# Patient Record
Sex: FEMALE | URBAN - METROPOLITAN AREA
[De-identification: names, ages, dates, MRNs, and addresses within clinical notes are randomized per-mention and may not be internally consistent; named-entity substitution may affect disease eponyms.]

---

## 2019-05-10 ENCOUNTER — APPOINTMENT (OUTPATIENT)
Age: 24
Setting detail: DERMATOLOGY
End: 2019-05-11

## 2019-05-10 VITALS — HEIGHT: 64 IN | WEIGHT: 145 LBS

## 2019-05-10 DIAGNOSIS — L20.89 OTHER ATOPIC DERMATITIS: ICD-10-CM

## 2019-05-10 PROCEDURE — 99202 OFFICE O/P NEW SF 15 MIN: CPT

## 2019-05-10 PROCEDURE — OTHER COUNSELING: OTHER

## 2019-05-10 PROCEDURE — OTHER PRESCRIPTION: OTHER

## 2019-05-10 RX ORDER — FLUTICASONE PROPIONATE 0.05 MG/G
THIN LAYER OINTMENT TOPICAL PRN
Qty: 1 | Refills: 1 | Status: ERX | COMMUNITY
Start: 2019-05-10

## 2019-05-10 RX ORDER — CRISABOROLE 20 MG/G
THIN LAYER OINTMENT TOPICAL BID
Qty: 1 | Refills: 2 | Status: ERX | COMMUNITY
Start: 2019-05-10

## 2019-05-10 ASSESSMENT — LOCATION SIMPLE DESCRIPTION DERM: LOCATION SIMPLE: LEFT LIP

## 2019-05-10 ASSESSMENT — LOCATION ZONE DERM: LOCATION ZONE: LIP

## 2019-05-10 ASSESSMENT — LOCATION DETAILED DESCRIPTION DERM: LOCATION DETAILED: LEFT SUPERIOR VERMILION LIP

## 2019-05-10 NOTE — HPI: RASH
How Severe Is Your Rash?: moderate
Is This A New Presentation, Or A Follow-Up?: Rash
Additional History: Her lips have always been dry. Then a year ago, she noticed that she began getting blisters on her lips. 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

## 2019-05-10 NOTE — PROCEDURE: COUNSELING
Patient Specific Counseling (Will Not Stick From Patient To Patient): Patient is using Dr. Dale wilson currently. Try using different face masks at work to see if this is the cause of the irritant. Likely it could be an irritant contact dermatitis from something her lips are coming into contact as it will improve when she’s not working. Send eucrisa to [facility]. Accidentally sent it to [facility] Patient Specific Counseling (Will Not Stick From Patient To Patient): Patient is using Dr. Dale wilson currently. Try using different face masks at work to see if this is the cause of the irritant. Likely it could be an irritant contact dermatitis from something her lips are coming into contact as it will improve when she’s not working. Send eucrisa to [facility] pharmacy. Accidentally sent it to [facility]

## 2019-05-11 RX ORDER — CRISABOROLE 20 MG/G
A THIN LAYER OINTMENT TOPICAL BID
Qty: 1 | Refills: 2 | Status: ERX | COMMUNITY
Start: 2019-05-11

## 2019-05-16 ENCOUNTER — RX ONLY (RX ONLY)
Age: 24
End: 2019-05-16

## 2019-05-16 RX ORDER — TACROLIMUS 1 MG/G
A THIN LAYER OINTMENT TOPICAL BID
Qty: 1 | Refills: 0 | Status: ERX | COMMUNITY
Start: 2019-05-16

## 2021-05-14 ENCOUNTER — APPOINTMENT (OUTPATIENT)
Dept: URBAN - METROPOLITAN AREA CLINIC 260 | Age: 26
Setting detail: DERMATOLOGY
End: 2021-05-14

## 2021-05-14 VITALS — HEIGHT: 64 IN | WEIGHT: 140 LBS

## 2021-05-14 DIAGNOSIS — D22 MELANOCYTIC NEVI: ICD-10-CM

## 2021-05-14 DIAGNOSIS — D18.0 HEMANGIOMA: ICD-10-CM

## 2021-05-14 DIAGNOSIS — L20.89 OTHER ATOPIC DERMATITIS: ICD-10-CM

## 2021-05-14 DIAGNOSIS — L82.1 OTHER SEBORRHEIC KERATOSIS: ICD-10-CM

## 2021-05-14 DIAGNOSIS — L81.4 OTHER MELANIN HYPERPIGMENTATION: ICD-10-CM

## 2021-05-14 DIAGNOSIS — Z71.89 OTHER SPECIFIED COUNSELING: ICD-10-CM

## 2021-05-14 PROBLEM — D23.71 OTHER BENIGN NEOPLASM OF SKIN OF RIGHT LOWER LIMB, INCLUDING HIP: Status: ACTIVE | Noted: 2021-05-14

## 2021-05-14 PROBLEM — D18.01 HEMANGIOMA OF SKIN AND SUBCUTANEOUS TISSUE: Status: ACTIVE | Noted: 2021-05-14

## 2021-05-14 PROBLEM — L20.84 INTRINSIC (ALLERGIC) ECZEMA: Status: ACTIVE | Noted: 2021-05-14

## 2021-05-14 PROBLEM — D22.5 MELANOCYTIC NEVI OF TRUNK: Status: ACTIVE | Noted: 2021-05-14

## 2021-05-14 PROCEDURE — 99214 OFFICE O/P EST MOD 30 MIN: CPT

## 2021-05-14 PROCEDURE — OTHER PRESCRIPTION: OTHER

## 2021-05-14 PROCEDURE — OTHER ADDITIONAL NOTES: OTHER

## 2021-05-14 PROCEDURE — OTHER COUNSELING: OTHER

## 2021-05-14 PROCEDURE — OTHER PHOTO-DOCUMENTATION: OTHER

## 2021-05-14 RX ORDER — BETAMETHASONE DIPROPIONATE 0.5 MG/G
CREAM TOPICAL BID
Qty: 1 | Refills: 0 | Status: ERX | COMMUNITY
Start: 2021-05-14

## 2021-05-14 ASSESSMENT — LOCATION DETAILED DESCRIPTION DERM
LOCATION DETAILED: LEFT MEDIAL BREAST 10-11:00 REGION
LOCATION DETAILED: LEFT LATERAL ABDOMEN
LOCATION DETAILED: INFERIOR THORACIC SPINE

## 2021-05-14 ASSESSMENT — LOCATION SIMPLE DESCRIPTION DERM
LOCATION SIMPLE: ABDOMEN
LOCATION SIMPLE: LEFT BREAST
LOCATION SIMPLE: UPPER BACK

## 2021-05-14 ASSESSMENT — LOCATION ZONE DERM: LOCATION ZONE: TRUNK

## 2021-05-14 NOTE — PROCEDURE: PHOTO-DOCUMENTATION
Detail Level: Detailed
Photo Preface (Leave Blank If You Do Not Want): Photographs were obtained today to monitor

## 2021-05-14 NOTE — PROCEDURE: ADDITIONAL NOTES
Detail Level: Detailed
Additional Notes: Patient uses a previously prescribed steroid cream Betamethasone to left lower leg.
Render Risk Assessment In Note?: no

## 2021-05-26 ENCOUNTER — RECORDS - HEALTHEAST (OUTPATIENT)
Dept: ADMINISTRATIVE | Facility: CLINIC | Age: 26
End: 2021-05-26

## 2023-02-15 LAB
GROUP B STREPTOCOCCUS (EXTERNAL): NEGATIVE
HEPATITIS B SURFACE ANTIGEN (EXTERNAL): NONREACTIVE
HEPATITIS C ANTIBODY (EXTERNAL): NONREACTIVE
HIV1+2 AB SERPL QL IA: NONREACTIVE
RUBELLA ANTIBODY IGG (EXTERNAL): NONREACTIVE

## 2023-07-07 LAB — TREPONEMA PALLIDUM ANTIBODY (EXTERNAL): NONREACTIVE

## 2023-07-28 ENCOUNTER — APPOINTMENT (OUTPATIENT)
Dept: URBAN - METROPOLITAN AREA CLINIC 260 | Age: 28
Setting detail: DERMATOLOGY
End: 2023-07-29

## 2023-07-28 DIAGNOSIS — D18.0 HEMANGIOMA: ICD-10-CM

## 2023-07-28 DIAGNOSIS — Z71.89 OTHER SPECIFIED COUNSELING: ICD-10-CM

## 2023-07-28 DIAGNOSIS — L81.4 OTHER MELANIN HYPERPIGMENTATION: ICD-10-CM

## 2023-07-28 DIAGNOSIS — I78.8 OTHER DISEASES OF CAPILLARIES: ICD-10-CM

## 2023-07-28 DIAGNOSIS — L90.6 STRIAE ATROPHICAE: ICD-10-CM

## 2023-07-28 DIAGNOSIS — L57.8 OTHER SKIN CHANGES DUE TO CHRONIC EXPOSURE TO NONIONIZING RADIATION: ICD-10-CM

## 2023-07-28 DIAGNOSIS — D22 MELANOCYTIC NEVI: ICD-10-CM

## 2023-07-28 DIAGNOSIS — Z33.1 PREGNANT STATE, INCIDENTAL: ICD-10-CM

## 2023-07-28 PROBLEM — D18.01 HEMANGIOMA OF SKIN AND SUBCUTANEOUS TISSUE: Status: ACTIVE | Noted: 2023-07-28

## 2023-07-28 PROBLEM — D22.5 MELANOCYTIC NEVI OF TRUNK: Status: ACTIVE | Noted: 2023-07-28

## 2023-07-28 PROBLEM — D23.71 OTHER BENIGN NEOPLASM OF SKIN OF RIGHT LOWER LIMB, INCLUDING HIP: Status: ACTIVE | Noted: 2023-07-28

## 2023-07-28 PROCEDURE — OTHER MIPS QUALITY: OTHER

## 2023-07-28 PROCEDURE — 99213 OFFICE O/P EST LOW 20 MIN: CPT

## 2023-07-28 PROCEDURE — OTHER ADDITIONAL NOTES: OTHER

## 2023-07-28 PROCEDURE — OTHER SUNSCREEN RECOMMENDATIONS: OTHER

## 2023-07-28 PROCEDURE — OTHER COUNSELING: OTHER

## 2023-07-28 ASSESSMENT — LOCATION DETAILED DESCRIPTION DERM
LOCATION DETAILED: LEFT MEDIAL BREAST 6-7:00 REGION
LOCATION DETAILED: RIGHT MEDIAL UPPER BACK
LOCATION DETAILED: RIGHT SUPERIOR MEDIAL UPPER BACK
LOCATION DETAILED: LEFT CENTRAL MALAR CHEEK
LOCATION DETAILED: RIGHT LATERAL BREAST 6-7:00 REGION
LOCATION DETAILED: RIGHT INFERIOR MEDIAL UPPER BACK

## 2023-07-28 ASSESSMENT — LOCATION SIMPLE DESCRIPTION DERM
LOCATION SIMPLE: RIGHT UPPER BACK
LOCATION SIMPLE: LEFT BREAST
LOCATION SIMPLE: LEFT CHEEK
LOCATION SIMPLE: RIGHT BREAST

## 2023-07-28 ASSESSMENT — LOCATION ZONE DERM
LOCATION ZONE: TRUNK
LOCATION ZONE: FACE

## 2023-07-28 NOTE — PROCEDURE: ADDITIONAL NOTES
Detail Level: Simple
Render Risk Assessment In Note?: no
Additional Notes: Discussed safe options while patient is pregnant, recommended Vitamin E oil
Additional Notes: Discussed skin changes that can occur during pregnancy

## 2023-09-30 ENCOUNTER — HOSPITAL ENCOUNTER (INPATIENT)
Facility: CLINIC | Age: 28
LOS: 3 days | Discharge: HOME | End: 2023-10-03
Attending: REGISTERED NURSE | Admitting: REGISTERED NURSE
Payer: COMMERCIAL

## 2023-09-30 DIAGNOSIS — D62 ANEMIA DUE TO BLOOD LOSS, ACUTE: ICD-10-CM

## 2023-09-30 PROBLEM — O42.90 PROM (PREMATURE RUPTURE OF MEMBRANES): Status: ACTIVE | Noted: 2023-09-30

## 2023-09-30 PROBLEM — Z36.89 ENCOUNTER FOR TRIAGE IN PREGNANT PATIENT: Status: ACTIVE | Noted: 2023-09-30

## 2023-09-30 LAB
ABO/RH(D): NORMAL
ALBUMIN MFR UR ELPH: <6 MG/DL
ALBUMIN SERPL BCG-MCNC: 3.5 G/DL (ref 3.5–5.2)
ALP SERPL-CCNC: 121 U/L (ref 35–104)
ALT SERPL W P-5'-P-CCNC: 10 U/L (ref 0–50)
ANION GAP SERPL CALCULATED.3IONS-SCNC: 12 MMOL/L (ref 7–15)
AST SERPL W P-5'-P-CCNC: 17 U/L (ref 0–45)
BILIRUB SERPL-MCNC: 0.2 MG/DL
BUN SERPL-MCNC: 7.2 MG/DL (ref 6–20)
CALCIUM SERPL-MCNC: 8.8 MG/DL (ref 8.6–10)
CHLORIDE SERPL-SCNC: 106 MMOL/L (ref 98–107)
CREAT SERPL-MCNC: 0.64 MG/DL (ref 0.51–0.95)
CREAT UR-MCNC: 44.5 MG/DL
DEPRECATED HCO3 PLAS-SCNC: 20 MMOL/L (ref 22–29)
EGFRCR SERPLBLD CKD-EPI 2021: >90 ML/MIN/1.73M2
ERYTHROCYTE [DISTWIDTH] IN BLOOD BY AUTOMATED COUNT: 13.6 % (ref 10–15)
GLUCOSE SERPL-MCNC: 75 MG/DL (ref 70–99)
HCT VFR BLD AUTO: 36 % (ref 35–47)
HGB BLD-MCNC: 11.8 G/DL (ref 11.7–15.7)
MCH RBC QN AUTO: 29.1 PG (ref 26.5–33)
MCHC RBC AUTO-ENTMCNC: 32.8 G/DL (ref 31.5–36.5)
MCV RBC AUTO: 89 FL (ref 78–100)
PLATELET # BLD AUTO: 141 10E3/UL (ref 150–450)
POTASSIUM SERPL-SCNC: 3.8 MMOL/L (ref 3.4–5.3)
PROT SERPL-MCNC: 6 G/DL (ref 6.4–8.3)
PROT/CREAT 24H UR: NORMAL MG/G{CREAT}
RBC # BLD AUTO: 4.05 10E6/UL (ref 3.8–5.2)
RUPTURE OF FETAL MEMBRANES BY ROM PLUS: POSITIVE
SODIUM SERPL-SCNC: 138 MMOL/L (ref 135–145)
SPECIMEN EXPIRATION DATE: NORMAL
WBC # BLD AUTO: 17.5 10E3/UL (ref 4–11)

## 2023-09-30 PROCEDURE — 120N000001 HC R&B MED SURG/OB

## 2023-09-30 PROCEDURE — 86901 BLOOD TYPING SEROLOGIC RH(D): CPT | Performed by: REGISTERED NURSE

## 2023-09-30 PROCEDURE — 258N000003 HC RX IP 258 OP 636: Performed by: REGISTERED NURSE

## 2023-09-30 PROCEDURE — 250N000013 HC RX MED GY IP 250 OP 250 PS 637: Performed by: REGISTERED NURSE

## 2023-09-30 PROCEDURE — 85027 COMPLETE CBC AUTOMATED: CPT | Performed by: REGISTERED NURSE

## 2023-09-30 PROCEDURE — 80053 COMPREHEN METABOLIC PANEL: CPT | Performed by: REGISTERED NURSE

## 2023-09-30 PROCEDURE — 250N000011 HC RX IP 250 OP 636: Mod: JZ | Performed by: REGISTERED NURSE

## 2023-09-30 PROCEDURE — 84112 EVAL AMNIOTIC FLUID PROTEIN: CPT | Performed by: REGISTERED NURSE

## 2023-09-30 PROCEDURE — 36415 COLL VENOUS BLD VENIPUNCTURE: CPT | Performed by: REGISTERED NURSE

## 2023-09-30 PROCEDURE — 250N000009 HC RX 250: Performed by: REGISTERED NURSE

## 2023-09-30 PROCEDURE — 84156 ASSAY OF PROTEIN URINE: CPT | Performed by: REGISTERED NURSE

## 2023-09-30 RX ORDER — IBUPROFEN 800 MG/1
800 TABLET, FILM COATED ORAL
Status: DISCONTINUED | OUTPATIENT
Start: 2023-09-30 | End: 2023-10-03 | Stop reason: HOSPADM

## 2023-09-30 RX ORDER — OXYTOCIN 10 [USP'U]/ML
10 INJECTION, SOLUTION INTRAMUSCULAR; INTRAVENOUS
Status: DISCONTINUED | OUTPATIENT
Start: 2023-09-30 | End: 2023-10-03 | Stop reason: HOSPADM

## 2023-09-30 RX ORDER — METHYLERGONOVINE MALEATE 0.2 MG/ML
200 INJECTION INTRAVENOUS
Status: DISCONTINUED | OUTPATIENT
Start: 2023-09-30 | End: 2023-10-01 | Stop reason: HOSPADM

## 2023-09-30 RX ORDER — ONDANSETRON 4 MG/1
4 TABLET, ORALLY DISINTEGRATING ORAL EVERY 6 HOURS PRN
Status: DISCONTINUED | OUTPATIENT
Start: 2023-09-30 | End: 2023-10-01 | Stop reason: HOSPADM

## 2023-09-30 RX ORDER — CITRIC ACID/SODIUM CITRATE 334-500MG
30 SOLUTION, ORAL ORAL
Status: DISCONTINUED | OUTPATIENT
Start: 2023-09-30 | End: 2023-10-01 | Stop reason: HOSPADM

## 2023-09-30 RX ORDER — MISOPROSTOL 100 UG/1
25 TABLET ORAL
Status: DISCONTINUED | OUTPATIENT
Start: 2023-09-30 | End: 2023-09-30

## 2023-09-30 RX ORDER — LIDOCAINE 40 MG/G
CREAM TOPICAL
Status: DISCONTINUED | OUTPATIENT
Start: 2023-09-30 | End: 2023-09-30 | Stop reason: HOSPADM

## 2023-09-30 RX ORDER — TERBUTALINE SULFATE 1 MG/ML
0.25 INJECTION, SOLUTION SUBCUTANEOUS
Status: DISCONTINUED | OUTPATIENT
Start: 2023-09-30 | End: 2023-10-01 | Stop reason: HOSPADM

## 2023-09-30 RX ORDER — PROCHLORPERAZINE MALEATE 10 MG
10 TABLET ORAL EVERY 6 HOURS PRN
Status: DISCONTINUED | OUTPATIENT
Start: 2023-09-30 | End: 2023-10-01 | Stop reason: HOSPADM

## 2023-09-30 RX ORDER — NALOXONE HYDROCHLORIDE 0.4 MG/ML
0.2 INJECTION, SOLUTION INTRAMUSCULAR; INTRAVENOUS; SUBCUTANEOUS
Status: DISCONTINUED | OUTPATIENT
Start: 2023-09-30 | End: 2023-10-01 | Stop reason: HOSPADM

## 2023-09-30 RX ORDER — NALOXONE HYDROCHLORIDE 0.4 MG/ML
0.4 INJECTION, SOLUTION INTRAMUSCULAR; INTRAVENOUS; SUBCUTANEOUS
Status: DISCONTINUED | OUTPATIENT
Start: 2023-09-30 | End: 2023-10-01 | Stop reason: HOSPADM

## 2023-09-30 RX ORDER — METOCLOPRAMIDE HYDROCHLORIDE 5 MG/ML
10 INJECTION INTRAMUSCULAR; INTRAVENOUS EVERY 6 HOURS PRN
Status: DISCONTINUED | OUTPATIENT
Start: 2023-09-30 | End: 2023-10-01 | Stop reason: HOSPADM

## 2023-09-30 RX ORDER — OXYTOCIN 10 [USP'U]/ML
10 INJECTION, SOLUTION INTRAMUSCULAR; INTRAVENOUS
Status: DISCONTINUED | OUTPATIENT
Start: 2023-09-30 | End: 2023-10-01 | Stop reason: HOSPADM

## 2023-09-30 RX ORDER — MISOPROSTOL 200 UG/1
400 TABLET ORAL
Status: DISCONTINUED | OUTPATIENT
Start: 2023-09-30 | End: 2023-10-01 | Stop reason: HOSPADM

## 2023-09-30 RX ORDER — OXYTOCIN/0.9 % SODIUM CHLORIDE 30/500 ML
100-340 PLASTIC BAG, INJECTION (ML) INTRAVENOUS CONTINUOUS PRN
Status: DISCONTINUED | OUTPATIENT
Start: 2023-09-30 | End: 2023-10-03 | Stop reason: HOSPADM

## 2023-09-30 RX ORDER — PANTOPRAZOLE SODIUM 40 MG/1
40 TABLET, DELAYED RELEASE ORAL AT BEDTIME
Status: DISCONTINUED | OUTPATIENT
Start: 2023-09-30 | End: 2023-10-03 | Stop reason: HOSPADM

## 2023-09-30 RX ORDER — CARBOPROST TROMETHAMINE 250 UG/ML
250 INJECTION, SOLUTION INTRAMUSCULAR
Status: DISCONTINUED | OUTPATIENT
Start: 2023-09-30 | End: 2023-10-01 | Stop reason: HOSPADM

## 2023-09-30 RX ORDER — OXYTOCIN/0.9 % SODIUM CHLORIDE 30/500 ML
340 PLASTIC BAG, INJECTION (ML) INTRAVENOUS CONTINUOUS PRN
Status: DISCONTINUED | OUTPATIENT
Start: 2023-09-30 | End: 2023-10-01 | Stop reason: HOSPADM

## 2023-09-30 RX ORDER — SODIUM CHLORIDE, SODIUM LACTATE, POTASSIUM CHLORIDE, CALCIUM CHLORIDE 600; 310; 30; 20 MG/100ML; MG/100ML; MG/100ML; MG/100ML
10-125 INJECTION, SOLUTION INTRAVENOUS CONTINUOUS
Status: DISCONTINUED | OUTPATIENT
Start: 2023-09-30 | End: 2023-10-03 | Stop reason: HOSPADM

## 2023-09-30 RX ORDER — MISOPROSTOL 200 UG/1
800 TABLET ORAL
Status: DISCONTINUED | OUTPATIENT
Start: 2023-09-30 | End: 2023-10-01 | Stop reason: HOSPADM

## 2023-09-30 RX ORDER — OXYTOCIN/0.9 % SODIUM CHLORIDE 30/500 ML
1-24 PLASTIC BAG, INJECTION (ML) INTRAVENOUS CONTINUOUS
Status: DISCONTINUED | OUTPATIENT
Start: 2023-09-30 | End: 2023-10-01 | Stop reason: HOSPADM

## 2023-09-30 RX ORDER — LABETALOL HYDROCHLORIDE 5 MG/ML
20-80 INJECTION, SOLUTION INTRAVENOUS EVERY 10 MIN PRN
Status: DISCONTINUED | OUTPATIENT
Start: 2023-09-30 | End: 2023-10-03 | Stop reason: HOSPADM

## 2023-09-30 RX ORDER — HYDRALAZINE HYDROCHLORIDE 20 MG/ML
10 INJECTION INTRAMUSCULAR; INTRAVENOUS
Status: DISCONTINUED | OUTPATIENT
Start: 2023-09-30 | End: 2023-10-03 | Stop reason: HOSPADM

## 2023-09-30 RX ORDER — KETOROLAC TROMETHAMINE 30 MG/ML
30 INJECTION, SOLUTION INTRAMUSCULAR; INTRAVENOUS
Status: DISCONTINUED | OUTPATIENT
Start: 2023-09-30 | End: 2023-10-03 | Stop reason: HOSPADM

## 2023-09-30 RX ORDER — CALCIUM CARBONATE 500 MG/1
1000 TABLET, CHEWABLE ORAL 3 TIMES DAILY PRN
Status: DISCONTINUED | OUTPATIENT
Start: 2023-09-30 | End: 2023-10-03 | Stop reason: HOSPADM

## 2023-09-30 RX ORDER — METOCLOPRAMIDE 10 MG/1
10 TABLET ORAL EVERY 6 HOURS PRN
Status: DISCONTINUED | OUTPATIENT
Start: 2023-09-30 | End: 2023-10-01 | Stop reason: HOSPADM

## 2023-09-30 RX ORDER — SODIUM CHLORIDE, SODIUM LACTATE, POTASSIUM CHLORIDE, CALCIUM CHLORIDE 600; 310; 30; 20 MG/100ML; MG/100ML; MG/100ML; MG/100ML
INJECTION, SOLUTION INTRAVENOUS CONTINUOUS PRN
Status: DISCONTINUED | OUTPATIENT
Start: 2023-09-30 | End: 2023-10-01 | Stop reason: HOSPADM

## 2023-09-30 RX ORDER — MORPHINE SULFATE 10 MG/ML
10 INJECTION, SOLUTION INTRAMUSCULAR; INTRAVENOUS
Status: COMPLETED | OUTPATIENT
Start: 2023-09-30 | End: 2023-10-01

## 2023-09-30 RX ORDER — HYDROXYZINE HYDROCHLORIDE 25 MG/1
100 TABLET, FILM COATED ORAL
Status: DISPENSED | OUTPATIENT
Start: 2023-09-30 | End: 2023-10-01

## 2023-09-30 RX ORDER — PROCHLORPERAZINE 25 MG
25 SUPPOSITORY, RECTAL RECTAL EVERY 12 HOURS PRN
Status: DISCONTINUED | OUTPATIENT
Start: 2023-09-30 | End: 2023-10-01 | Stop reason: HOSPADM

## 2023-09-30 RX ORDER — FENTANYL CITRATE 50 UG/ML
100 INJECTION, SOLUTION INTRAMUSCULAR; INTRAVENOUS
Status: DISCONTINUED | OUTPATIENT
Start: 2023-09-30 | End: 2023-10-01 | Stop reason: HOSPADM

## 2023-09-30 RX ORDER — ONDANSETRON 2 MG/ML
4 INJECTION INTRAMUSCULAR; INTRAVENOUS EVERY 6 HOURS PRN
Status: DISCONTINUED | OUTPATIENT
Start: 2023-09-30 | End: 2023-10-01 | Stop reason: HOSPADM

## 2023-09-30 RX ADMIN — PANTOPRAZOLE SODIUM 40 MG: 40 TABLET, DELAYED RELEASE ORAL at 21:17

## 2023-09-30 RX ADMIN — FAMOTIDINE 20 MG: 10 INJECTION, SOLUTION INTRAVENOUS at 18:58

## 2023-09-30 RX ADMIN — Medication 1 MILLI-UNITS/MIN: at 19:01

## 2023-09-30 RX ADMIN — Medication 3 MILLI-UNITS/MIN: at 20:38

## 2023-09-30 RX ADMIN — Medication 2 MILLI-UNITS/MIN: at 19:48

## 2023-09-30 RX ADMIN — SODIUM CHLORIDE, POTASSIUM CHLORIDE, SODIUM LACTATE AND CALCIUM CHLORIDE 500 ML: 600; 310; 30; 20 INJECTION, SOLUTION INTRAVENOUS at 16:57

## 2023-09-30 RX ADMIN — SODIUM CHLORIDE, POTASSIUM CHLORIDE, SODIUM LACTATE AND CALCIUM CHLORIDE: 600; 310; 30; 20 INJECTION, SOLUTION INTRAVENOUS at 21:30

## 2023-09-30 RX ADMIN — Medication 5 MILLI-UNITS/MIN: at 22:00

## 2023-09-30 RX ADMIN — Medication 4 MILLI-UNITS/MIN: at 21:17

## 2023-09-30 ASSESSMENT — ACTIVITIES OF DAILY LIVING (ADL)
DOING_ERRANDS_INDEPENDENTLY_DIFFICULTY: NO
ADLS_ACUITY_SCORE: 20
DRESSING/BATHING_DIFFICULTY: NO
HEARING_DIFFICULTY_OR_DEAF: NO
FALL_HISTORY_WITHIN_LAST_SIX_MONTHS: NO
DIFFICULTY_EATING/SWALLOWING: NO
WEAR_GLASSES_OR_BLIND: YES
WALKING_OR_CLIMBING_STAIRS_DIFFICULTY: NO
VISION_MANAGEMENT: CONTACTS
ADLS_ACUITY_SCORE: 31
DIFFICULTY_COMMUNICATING: NO
TOILETING_ISSUES: NO
ADLS_ACUITY_SCORE: 20
CONCENTRATING,_REMEMBERING_OR_MAKING_DECISIONS_DIFFICULTY: NO
ADLS_ACUITY_SCORE: 20
ADLS_ACUITY_SCORE: 20

## 2023-09-30 NOTE — PLAN OF CARE
Data: [facility]: 2023  1:46 PM.  Reason for maternal/fetal assessment is leaking vaginal fluid. Patient reports SROM since 23 around 1730. Patient denies uterine contractions, leaking of vaginal fluid/rupture of membranes. Patient reports fetal movement is normal. Patient is a Unknown . Prenatal record reviewed. Pregnancy has been uncomplicated. Support person is present.     Fetal HR baseline was 135, variability is minimal (detectable, amplitude less than or equal to 5 bpm). Accelerations: increase 15 bpm above baseline lasting 15 seconds. Decelerations: absent. Uterine assessment is mild by palpation during contractions and soft by palpation at rest. Cervix 1 cm dilated and 60-70% effaced. Fetal station -2. Fetal presentation   per  not detected . Membranes:  ROM positive on admission .    Vital signs  elevated BP . Patient reports pain and is coping.     Action: Verbal consent for EFM. Triage assessment completed. Patient may meet criteria for early labor.     Response: Patient verbalized understanding of triage assessment. Will contact monitor with assessment and consideration of early labor discharge vs admission.

## 2023-09-30 NOTE — H&P
"HISTORY AND PHYSICAL UPDATE ADMISSION EXAM    Name: Mariola Larose  YOB: 1995  Medical Record Number: [id number]    History of Present Illness: Mariola Larose is a 28 year old female  who is 40w5d pregnant and being admitted for SROM. She initially noticed LOF yesterday at 1530- she wasn't sure if she was just leaking urine, and continued to monitor. After continuing to leak throughout the night and day, she [place] [facility] [place]OM was confirmed with a ROM plus. GBS negative. On admission BP was noted to be 148/93, repeat 140/86. Denies headache, visual changes, RUQ pain, N/V, SOB, and dizziness. She is supported by her , Sy.     Last EFW  at 40w4d, 7#2oz (3240g)    Estimated Date of Delivery: 23      OB History    Para Term  AB Living   1 0 0 0 0 0   SAB IAB Ectopic Multiple Live Births   0 0 0 0 0      # Outcome Date GA Lbr Cuong/2nd Weight Sex Delivery Anes PTL Lv   1 Current               Tdap 23    Prenatal Complications:   COVID-19 at 10 weeks  Thrombocytopenia (28 wks: 128; 30 wks: 136; 36 wks: 120)    Exam:      BP (!) 148/93 (BP Location: Right arm, Patient Position: Semi-Randle's, Cuff Size: Adult Regular)   Pulse 89   Temp 98.3  F (36.8  C) (Oral)   Resp 19   Ht 1.626 m (5' 4\")   Wt 85.3 kg (188 lb)   SpO2 96%   BMI 32.27 kg/m      Fetal heart Rate Category 1  Contractions occasional, mild    HEENT grossly normal  Neck: no lymphadenopathy or thryoidomegaly  Lungs respirations regular, unlabored  ABD gravid, non-tender  EXT:  1+ non-pitting edema, moves freely    Vaginal exam /-2 per RN  Membranes: SROM, 23 @ 1530  Cephalic by clinic US yesterday and digital exam by RN today    Assessment:   - SROM  - GBS negative  - O positive    Plan:   - Admit - see IP orders  - HELLP labs ordered and WNL. HTN orderset placed but not yet diagnosed with gHTN given < 4 hours between elevations.   - Reviewed R/B/A of proceeding with PO cytotec " given prolonged ROM- Mariola is in agreement with this plan. - Proceed with 3 doses of PO cytotec, then transition to IV Pitocin. Discussed exam with rupture of forebag if applicable once regular strong contractions.   -Platelets 141 on admission  - Encourage frequent repositioning to facilitate fetal rotation and descent.   - Pain medication as requested.  - Anticipate   - Active management of the third stage    Prenatal record reviewed.    GERRY Clemente  I was present for all plan of care discussion and physical exam, I agree with the above documentation. Patient consented to [facility] participation in care. FIDEL Harp Dr. remains available for consultation and collaboration as needed.      2023   2:38 PM

## 2023-09-30 NOTE — PROGRESS NOTES
Since admission have been monitoring FHR following prolonged episode of tachycardia to 180s with wandering baseline. Standard intrauterine rescuscitation measures were used and ultimately FHR resolved to category 1. Pt is afebrile and WBC are WNL.   Per RN and TOCO Mariola has started mildly dayna and is now likely in early labor. Experiencing this as strong period cramps. Given PROM > 24 hours still recommend using induction methods but at this time would recommend low dose pitocin vs cytotec. She and her partner are agreeable to this plan.

## 2023-09-30 NOTE — PROVIDER NOTIFICATION
RN paged CNM Bessy Ball, current plan to begin cytotec. Patient changed positions from her LT side to her back and baby's OH became elevated. Notified CNM of fetal tachycardia, new orders to establish IV access and administer 500mL bolus, RN to monitor for the next 30 minutes.    Bolus initiated after IV placement at 1657. Per CNM, administer whole liter of fluid and monitor EFM. RN to hold cytotec administration, to touch base with CNM after fluids are in.

## 2023-10-01 ENCOUNTER — ANESTHESIA EVENT (OUTPATIENT)
Dept: OBGYN | Facility: CLINIC | Age: 28
End: 2023-10-01
Payer: COMMERCIAL

## 2023-10-01 ENCOUNTER — ANESTHESIA (OUTPATIENT)
Dept: OBGYN | Facility: CLINIC | Age: 28
End: 2023-10-01
Payer: COMMERCIAL

## 2023-10-01 LAB
ABO/RH(D): NORMAL
ANTIBODY SCREEN: NEGATIVE
HGB BLD-MCNC: 10.7 G/DL (ref 11.7–15.7)
HOLD SPECIMEN: NORMAL
HOLD SPECIMEN: NORMAL
PLATELET # BLD AUTO: 146 10E3/UL (ref 150–450)
SPECIMEN EXPIRATION DATE: NORMAL

## 2023-10-01 PROCEDURE — 722N000001 HC LABOR CARE VAGINAL DELIVERY SINGLE

## 2023-10-01 PROCEDURE — 250N000011 HC RX IP 250 OP 636: Mod: JZ

## 2023-10-01 PROCEDURE — 0W3R7ZZ CONTROL BLEEDING IN GENITOURINARY TRACT, VIA NATURAL OR ARTIFICIAL OPENING: ICD-10-PCS | Performed by: ADVANCED PRACTICE MIDWIFE

## 2023-10-01 PROCEDURE — 999N000016 HC STATISTIC ATTENDANCE AT DELIVERY

## 2023-10-01 PROCEDURE — 250N000011 HC RX IP 250 OP 636

## 2023-10-01 PROCEDURE — 3E0R3BZ INTRODUCTION OF ANESTHETIC AGENT INTO SPINAL CANAL, PERCUTANEOUS APPROACH: ICD-10-PCS

## 2023-10-01 PROCEDURE — 250N000009 HC RX 250

## 2023-10-01 PROCEDURE — 370N000003 HC ANESTHESIA WARD SERVICE: Performed by: ANESTHESIOLOGY

## 2023-10-01 PROCEDURE — 258N000003 HC RX IP 258 OP 636: Performed by: ADVANCED PRACTICE MIDWIFE

## 2023-10-01 PROCEDURE — 250N000013 HC RX MED GY IP 250 OP 250 PS 637: Performed by: REGISTERED NURSE

## 2023-10-01 PROCEDURE — 250N000009 HC RX 250: Performed by: REGISTERED NURSE

## 2023-10-01 PROCEDURE — 250N000009 HC RX 250: Performed by: ANESTHESIOLOGY

## 2023-10-01 PROCEDURE — 10907ZC DRAINAGE OF AMNIOTIC FLUID, THERAPEUTIC FROM PRODUCTS OF CONCEPTION, VIA NATURAL OR ARTIFICIAL OPENING: ICD-10-PCS | Performed by: ADVANCED PRACTICE MIDWIFE

## 2023-10-01 PROCEDURE — 36415 COLL VENOUS BLD VENIPUNCTURE: CPT | Performed by: ADVANCED PRACTICE MIDWIFE

## 2023-10-01 PROCEDURE — 258N000003 HC RX IP 258 OP 636: Performed by: REGISTERED NURSE

## 2023-10-01 PROCEDURE — 85018 HEMOGLOBIN: CPT | Performed by: ADVANCED PRACTICE MIDWIFE

## 2023-10-01 PROCEDURE — 86901 BLOOD TYPING SEROLOGIC RH(D): CPT | Performed by: ADVANCED PRACTICE MIDWIFE

## 2023-10-01 PROCEDURE — 120N000001 HC R&B MED SURG/OB

## 2023-10-01 PROCEDURE — 250N000011 HC RX IP 250 OP 636: Performed by: REGISTERED NURSE

## 2023-10-01 PROCEDURE — 00HU33Z INSERTION OF INFUSION DEVICE INTO SPINAL CANAL, PERCUTANEOUS APPROACH: ICD-10-PCS

## 2023-10-01 PROCEDURE — 85049 AUTOMATED PLATELET COUNT: CPT | Performed by: ADVANCED PRACTICE MIDWIFE

## 2023-10-01 PROCEDURE — 250N000011 HC RX IP 250 OP 636: Mod: JZ | Performed by: ADVANCED PRACTICE MIDWIFE

## 2023-10-01 RX ORDER — BUPIVACAINE HYDROCHLORIDE 2.5 MG/ML
INJECTION, SOLUTION EPIDURAL; INFILTRATION; INTRACAUDAL PRN
Status: DISCONTINUED | OUTPATIENT
Start: 2023-10-01 | End: 2023-10-01

## 2023-10-01 RX ORDER — FENTANYL CITRATE-0.9 % NACL/PF 10 MCG/ML
100 PLASTIC BAG, INJECTION (ML) INTRAVENOUS EVERY 5 MIN PRN
Status: DISCONTINUED | OUTPATIENT
Start: 2023-10-01 | End: 2023-10-01 | Stop reason: HOSPADM

## 2023-10-01 RX ORDER — CEFAZOLIN SODIUM 2 G/100ML
2 INJECTION, SOLUTION INTRAVENOUS
Status: DISCONTINUED | OUTPATIENT
Start: 2023-10-01 | End: 2023-10-01

## 2023-10-01 RX ORDER — HYDROCORTISONE 25 MG/G
CREAM TOPICAL 3 TIMES DAILY PRN
Status: DISCONTINUED | OUTPATIENT
Start: 2023-10-01 | End: 2023-10-03 | Stop reason: HOSPADM

## 2023-10-01 RX ORDER — LIDOCAINE HYDROCHLORIDE AND EPINEPHRINE 15; 5 MG/ML; UG/ML
INJECTION, SOLUTION EPIDURAL PRN
Status: DISCONTINUED | OUTPATIENT
Start: 2023-10-01 | End: 2023-10-01

## 2023-10-01 RX ORDER — DOCUSATE SODIUM 100 MG/1
100 CAPSULE, LIQUID FILLED ORAL DAILY
Status: DISCONTINUED | OUTPATIENT
Start: 2023-10-02 | End: 2023-10-03 | Stop reason: HOSPADM

## 2023-10-01 RX ORDER — NALBUPHINE HYDROCHLORIDE 20 MG/ML
2.5-5 INJECTION, SOLUTION INTRAMUSCULAR; INTRAVENOUS; SUBCUTANEOUS EVERY 6 HOURS PRN
Status: DISCONTINUED | OUTPATIENT
Start: 2023-10-01 | End: 2023-10-03 | Stop reason: HOSPADM

## 2023-10-01 RX ORDER — MISOPROSTOL 200 UG/1
800 TABLET ORAL
Status: DISCONTINUED | OUTPATIENT
Start: 2023-10-01 | End: 2023-10-03 | Stop reason: HOSPADM

## 2023-10-01 RX ORDER — MODIFIED LANOLIN
OINTMENT (GRAM) TOPICAL
Status: DISCONTINUED | OUTPATIENT
Start: 2023-10-01 | End: 2023-10-03 | Stop reason: HOSPADM

## 2023-10-01 RX ORDER — CARBOPROST TROMETHAMINE 250 UG/ML
250 INJECTION, SOLUTION INTRAMUSCULAR
Status: DISCONTINUED | OUTPATIENT
Start: 2023-10-01 | End: 2023-10-03 | Stop reason: HOSPADM

## 2023-10-01 RX ORDER — SODIUM CHLORIDE, SODIUM LACTATE, POTASSIUM CHLORIDE, CALCIUM CHLORIDE 600; 310; 30; 20 MG/100ML; MG/100ML; MG/100ML; MG/100ML
INJECTION, SOLUTION INTRAVENOUS CONTINUOUS
Status: DISCONTINUED | OUTPATIENT
Start: 2023-10-01 | End: 2023-10-03 | Stop reason: HOSPADM

## 2023-10-01 RX ORDER — MISOPROSTOL 200 UG/1
400 TABLET ORAL
Status: DISCONTINUED | OUTPATIENT
Start: 2023-10-01 | End: 2023-10-03 | Stop reason: HOSPADM

## 2023-10-01 RX ORDER — METHYLERGONOVINE MALEATE 0.2 MG/ML
200 INJECTION INTRAVENOUS
Status: DISCONTINUED | OUTPATIENT
Start: 2023-10-01 | End: 2023-10-03 | Stop reason: HOSPADM

## 2023-10-01 RX ORDER — OXYTOCIN/0.9 % SODIUM CHLORIDE 30/500 ML
340 PLASTIC BAG, INJECTION (ML) INTRAVENOUS CONTINUOUS PRN
Status: DISCONTINUED | OUTPATIENT
Start: 2023-10-01 | End: 2023-10-03 | Stop reason: HOSPADM

## 2023-10-01 RX ORDER — IBUPROFEN 800 MG/1
800 TABLET, FILM COATED ORAL EVERY 6 HOURS PRN
Status: DISCONTINUED | OUTPATIENT
Start: 2023-10-01 | End: 2023-10-03 | Stop reason: HOSPADM

## 2023-10-01 RX ORDER — CEFAZOLIN SODIUM 2 G/100ML
2 INJECTION, SOLUTION INTRAVENOUS ONCE
Status: COMPLETED | OUTPATIENT
Start: 2023-10-01 | End: 2023-10-01

## 2023-10-01 RX ORDER — OXYTOCIN 10 [USP'U]/ML
10 INJECTION, SOLUTION INTRAMUSCULAR; INTRAVENOUS
Status: DISCONTINUED | OUTPATIENT
Start: 2023-10-01 | End: 2023-10-03 | Stop reason: HOSPADM

## 2023-10-01 RX ORDER — ACETAMINOPHEN 325 MG/1
650 TABLET ORAL EVERY 4 HOURS PRN
Status: DISCONTINUED | OUTPATIENT
Start: 2023-10-01 | End: 2023-10-03 | Stop reason: HOSPADM

## 2023-10-01 RX ORDER — BISACODYL 10 MG
10 SUPPOSITORY, RECTAL RECTAL DAILY PRN
Status: DISCONTINUED | OUTPATIENT
Start: 2023-10-01 | End: 2023-10-03 | Stop reason: HOSPADM

## 2023-10-01 RX ORDER — LIDOCAINE HCL/EPINEPHRINE/PF 2%-1:200K
VIAL (ML) INJECTION PRN
Status: DISCONTINUED | OUTPATIENT
Start: 2023-10-01 | End: 2023-10-01

## 2023-10-01 RX ORDER — FENTANYL/ROPIVACAINE/NS/PF 2MCG/ML-.1
PLASTIC BAG, INJECTION (ML) EPIDURAL
Status: DISCONTINUED | OUTPATIENT
Start: 2023-10-01 | End: 2023-10-01 | Stop reason: HOSPADM

## 2023-10-01 RX ADMIN — BUPIVACAINE HYDROCHLORIDE 6 ML: 2.5 INJECTION, SOLUTION EPIDURAL; INFILTRATION; INTRACAUDAL at 04:39

## 2023-10-01 RX ADMIN — TRANEXAMIC ACID 1 G: 1 INJECTION, SOLUTION INTRAVENOUS at 17:16

## 2023-10-01 RX ADMIN — TRANEXAMIC ACID 1 G: 1 INJECTION, SOLUTION INTRAVENOUS at 19:14

## 2023-10-01 RX ADMIN — SODIUM CHLORIDE, POTASSIUM CHLORIDE, SODIUM LACTATE AND CALCIUM CHLORIDE 500 ML: 600; 310; 30; 20 INJECTION, SOLUTION INTRAVENOUS at 04:05

## 2023-10-01 RX ADMIN — CEFAZOLIN SODIUM 2 G: 2 INJECTION, SOLUTION INTRAVENOUS at 21:00

## 2023-10-01 RX ADMIN — Medication: at 16:19

## 2023-10-01 RX ADMIN — Medication 6 MILLI-UNITS/MIN: at 01:47

## 2023-10-01 RX ADMIN — Medication 340 ML/HR: at 18:54

## 2023-10-01 RX ADMIN — LIDOCAINE HYDROCHLORIDE 20 ML: 10 INJECTION, SOLUTION INFILTRATION; PERINEURAL at 18:59

## 2023-10-01 RX ADMIN — Medication: at 04:39

## 2023-10-01 RX ADMIN — Medication 8 MILLI-UNITS/MIN: at 06:31

## 2023-10-01 RX ADMIN — LIDOCAINE HYDROCHLORIDE,EPINEPHRINE BITARTRATE 7 ML: 20; .005 INJECTION, SOLUTION EPIDURAL; INFILTRATION; INTRACAUDAL; PERINEURAL at 10:03

## 2023-10-01 RX ADMIN — SODIUM CHLORIDE, POTASSIUM CHLORIDE, SODIUM LACTATE AND CALCIUM CHLORIDE 100 ML: 600; 310; 30; 20 INJECTION, SOLUTION INTRAVENOUS at 21:05

## 2023-10-01 RX ADMIN — KETOROLAC TROMETHAMINE 30 MG: 30 INJECTION INTRAMUSCULAR; INTRAVENOUS at 20:36

## 2023-10-01 RX ADMIN — HYDROXYZINE HYDROCHLORIDE 100 MG: 25 TABLET, FILM COATED ORAL at 02:54

## 2023-10-01 RX ADMIN — MORPHINE SULFATE 10 MG: 10 INJECTION INTRAVENOUS at 02:53

## 2023-10-01 RX ADMIN — LIDOCAINE HYDROCHLORIDE,EPINEPHRINE BITARTRATE 3 ML: 15; .005 INJECTION, SOLUTION EPIDURAL; INFILTRATION; INTRACAUDAL; PERINEURAL at 04:37

## 2023-10-01 ASSESSMENT — ACTIVITIES OF DAILY LIVING (ADL)
ADLS_ACUITY_SCORE: 20

## 2023-10-01 NOTE — PROGRESS NOTES
Started Nitrous Oxide at 0214. Remained with patient. Her vital signs remained stable. She tolerated well, and reported that the nitrous helped a little with her contractions.     Carmita Junior RN

## 2023-10-01 NOTE — ANESTHESIA PREPROCEDURE EVALUATION
Anesthesia Pre-Procedure Evaluation    Patient: Mariola Larose   MRN: [id number] : 1995        Procedure : * No procedures listed *          History reviewed. No pertinent past medical history.   History reviewed. No pertinent surgical history.   Allergies   Allergen Reactions    Azithromycin Rash      Social History     Tobacco Use    Smoking status: Never     Passive exposure: Never    Smokeless tobacco: Never   Substance Use Topics    Alcohol use: Not on file      Wt Readings from Last 1 Encounters:   23 85.3 kg (188 lb)        Anesthesia Evaluation            ROS/MED HX  ENT/Pulmonary:  - neg pulmonary ROS     Neurologic:  - neg neurologic ROS     Cardiovascular:  - neg cardiovascular ROS     METS/Exercise Tolerance:     Hematologic:  - neg hematologic  ROS     Musculoskeletal:  - neg musculoskeletal ROS     GI/Hepatic:  - neg GI/hepatic ROS     Renal/Genitourinary:  - neg Renal ROS     Endo:  - neg endo ROS     Psychiatric/Substance Use:       Infectious Disease:  - neg infectious disease ROS     Malignancy:       Other:      (+) Possibly pregnant (pregnant), , ,         Physical Exam    Airway  airway exam normal           Respiratory Devices and Support         Dental           Cardiovascular             Pulmonary                   OUTSIDE LABS:  CBC:   Lab Results   Component Value Date    WBC 17.5 (H) 2023    HGB 11.8 2023    HCT 36.0 2023     (L) 2023     BMP:   Lab Results   Component Value Date     2023    POTASSIUM 3.8 2023    CHLORIDE 106 2023    CO2 20 (L) 2023    BUN 7.2 2023    CR 0.64 2023    GLC 75 2023     COAGS: No results found for: PTT, INR, FIBR  POC: No results found for: BGM, HCG, HCGS  HEPATIC:   Lab Results   Component Value Date    ALBUMIN 3.5 2023    PROTTOTAL 6.0 (L) 2023    ALT 10 2023    AST 17 2023    ALKPHOS 121 (H) 2023    BILITOTAL 0.2 2023     OTHER:    Lab Results   Component Value Date    FLORES 8.8 09/30/2023       Anesthesia Plan    ASA Status:  2       Anesthesia Type: Spinal.              Consents    Anesthesia Plan(s) and associated risks, benefits, and realistic alternatives discussed. Questions answered and patient/representative(s) expressed understanding.     - Discussed: Risks, Benefits and Alternatives for the PROCEDURE were discussed     - Discussed with:  Patient            Postoperative Care            Comments:    Other Comments: Patient requests labor epidural.  Chart reviewed, including labs.  I reviewed the options and risks with the patient, including but not limited to: bleeding, infection, damage to tissues under the skin (nerves, muscles, bloodvessels), hypotension, headache, and epidural failure.  The patient's questions were answered and the consent was signed. The patient agrees to elective epidural placement.                Khushboo Moyer MD

## 2023-10-01 NOTE — PROGRESS NOTES
"Patient Name:  Mariola Larose  :      1995  MRN:      [id number]    Assessment:     at 40w6d  Early labor  Category 1 FHTs  Prolonged ROM x 36 hrs  AROM of forebag  Gestational HTN      Plan:   -Discussed R/B/A of amniotomy. She consents to this AROM of forebag. Completed with return of small fluid and   -Continue position changes to optimize fetal descent.  -Routine support & management.   -Encourage position changes, ambulation as appropriate, rest as desired.  -Therapeutic rest, epidural when requested  -Anticipate progress and NSVB.   -Active management of the third stage    Subjective:  Mariola Larose is coping well with contractions. Tried N2O2 but did did not find it helpful- currently using non pharmacologic  methods for pain relief. Good PO fluid intake. Voiding without issue. She is interested in therapeutic rest, then likely an epidural when more actively laboring.  Sy supportive at bedside.     Objective:  /81 (BP Location: Right arm, Patient Position: Semi-Randle's, Cuff Size: Adult Regular)   Pulse 106   Temp 99.4  F (37.4  C) (Oral)   Resp 17   Ht 1.626 m (5' 4\")   Wt 85.3 kg (188 lb)   SpO2 96%   Breastfeeding No   BMI 32.27 kg/m      FHR:Baseline: 135 bpm, Variability: Moderate (6 - 25 bpm), Accelerations: present and Decelerations: Absent    Uterine contractions:Ronda Frequency: Every 3-4 minutes, Duration: 60-90 seconds and Intensity: moderate  Pitocin currently at 6 mu/min.     SVE: 3/90/-1    Provider: GERRY Clemente Dr. remains available for consultation and collaboration as needed.      Date:  10/1/2023  Time:  2:49 AM    "

## 2023-10-01 NOTE — PROGRESS NOTES
IUPC in place since 1211. MVU's initially ~150, then increased to 185. Dysfunctional contraction pattern with high frequency low amplitude contractions since 1355. IUPC flushed with no improvement and MVU's ~110. SVE now 8/100/0. IUC replaced and MVU's remain inadequate. Continue pitocin titration to achieve adequate MVU's. Last temp 99.2. Did have 1 prolonged decel x 3 minutes at 1407. Resolved with position change. Now with moderate variability, large accels, no decels. Continue attempts at .     YOVANNY Macias CNM on 10/1/2023 at 3:00 PM

## 2023-10-01 NOTE — ANESTHESIA PROCEDURE NOTES
"Epidural catheter Procedure Note    Pre-Procedure   Staff -        Anesthesiologist:  Khushboo Moyer MD       Performed By: anesthesiologist       Location: OB       Procedure Start/Stop Times: 10/1/2023 4:25 AM and 10/1/2023 4:43 AM       Pre-Anesthestic Checklist: patient identified, IV checked, risks and benefits discussed, informed consent, monitors and equipment checked, pre-op evaluation and post-op pain management  Timeout:       Correct Patient: Yes        Correct Procedure: Yes        Correct Site: Yes        Correct Position: Yes   Procedure Documentation  Procedure: epidural catheter       Diagnosis: labor pain       Patient Position: sitting       Patient Prep/Sterile Barriers: sterile gloves, mask, patient draped       Skin prep: Chloraprep       Local skin infiltrated with 3 mL of 1% lidocaine.        Insertion Site: L3-4. (midline approach).       Technique: LORT saline        VIKKI at 4.5 cm.       Needle Type: [employer]       Needle Gauge: 18.        Needle Length (Inches): 3.5        Catheter: 20 G.          Catheter threaded easily.         6 cm epidural space.         Threaded 11 cm at skin.         # of attempts: 1 and  # of redirects:     Assessment/Narrative         Paresthesias: No.       Test dose of 3 mL lidocaine 1.5% w/ 1:200,000 epinephrine at.         Test dose negative, 3 minutes after injection, for signs of intravascular, subdural, or intrathecal injection.       Insertion/Infusion Method: LORT saline       Aspiration negative for Heme or CSF via Epidural Catheter.    Medication(s) Administered   Medication Administration Time: 10/1/2023 4:25 AM      FOR [facility] ONLY:   Pain Team Contact information: please page the Pain Team Via [employer]. Search \"Pain\". During daytime hours, please page the attending first. At night please page the resident first.      "

## 2023-10-01 NOTE — PROGRESS NOTES
Feeling constant rectal pressure and requested SVE. 7/90/-1, puffy all the way around cervix compared to prior exam. Recommend IUPC placement which she consented to. Placed easily. Will titrate pitocin to achieve adequate labor. [facility] cat 1.     YOVANNY Macias CNM on 10/1/2023 at 12:14 PM

## 2023-10-01 NOTE — PROGRESS NOTES
Called to room 2 minutes into deceleration that occurred, when I arrived she was in hands and knees position. Standard resuscitative measures- pit off, fluid bolus infusing, on call MD called. FSE applied by me- now 6/100/-1 caput present. Fetus recovered after 7 min when turned to right side lying. FHT's prior to prolonged decel were cat 1. Now, moderate variability with accels. Temp 99.0 and does feel warm internally. Did have elevated BP's during time when her epidural wasn't working and immediately after prolonged decel. BP's have since normalized, no antihypertensive treatment was needed. Will monitor FHT's and restart pit at 6mu as able. We did discuss potential that a recurrence of fetal intolerance of labor could lead to a need for  but at this time will continue close monitoring and continue efforts towards .    Carie Jennings, YOVANNY PARRA on 10/1/2023 at 10:39 AM

## 2023-10-01 NOTE — PLAN OF CARE
Mariola is vitally stable. She managed pain overnight with tub, aromatherapy, nitrous oxide, morphine and finally an epidural (See MAR). She is now resting comfortably and sleeping as of 0445. She had previously SROM at 1700 9/29. Her forebag was ruptured by CN student at approximately 0235 10/1. Around 0330, Mariola experienced a larger gush of fluid. Fluid was found to be meconium stained at that time. Hidalgo catheter in place.     Carmita Junior RN      Problem: Plan of Care - These are the overarching goals to be used throughout the patient stay.    Goal: Optimal Comfort and Wellbeing  Outcome: Progressing     Problem: Plan of Care - These are the overarching goals to be used throughout the patient stay.    Goal: Readiness for Transition of Care  Outcome: Progressing     Problem: Labor  Goal: Acceptable Pain Control  Outcome: Progressing   Goal Outcome Evaluation:

## 2023-10-01 NOTE — PROGRESS NOTES
"Patient Name:  Mariola Larose  :      1995  MRN:      [id number]    Subjective:  Mariola Larose is coping well with contractions. Using epidural for pain relief- not getting adequate relief on left side. Had previously gotten great coverage from her epidural. RN paged MDA to assess. Hidalgo in place draining clear urine. , Brijesh, supportive and at bedside. Surprise gender! Pitocin at 12 mu. AROM forebag at 0230, SROM 1 hour later for mec fluid.     Objective:  /76   Pulse 100   Temp 98.4  F (36.9  C) (Oral)   Resp 17   Ht 1.626 m (5' 4\")   Wt 85.3 kg (188 lb)   SpO2 98%   Breastfeeding No   BMI 32.27 kg/m      FHR:Baseline: 135 bpm, Variability: Moderate (6 - 25 bpm), Accelerations: present and Decelerations: Early (currently resolved)    Uterine contractions:TocoFrequency: Every 1.5-3 minutes, Duration: 60-80 seconds and Intensity: strong    SVE: 4-5/100/-1, caput present, stretchy cervix    Assessment:     at 40w6d  Induction of labor s/p PROM  Category 1 FHTs  ROM x 41 hours, now with mec fluid, afebrile    Plan:   -Continue pitocin titration to achieve adequate labor  -Discussed IUPC on next exam if minimal change OR if it gets difficult to titrate pitocin due to contraction frequency.   -Routine support & management. Encourage position changes, rest as desired.  -Anticipate progress and NSVB.   -Active management of 3rd stage  -Reevaluate progress in 4 hours or sooner with a change in status.     Dr. Renee aware of patient status and remains available for consultation and collaboration as needed.    Provider:  YOVANNY Macias CNM    Date:  10/1/2023  Time:  9:26 AM  "

## 2023-10-02 PROBLEM — D62 ANEMIA DUE TO BLOOD LOSS, ACUTE: Status: ACTIVE | Noted: 2023-10-02

## 2023-10-02 LAB — HGB BLD-MCNC: 9.8 G/DL (ref 11.7–15.7)

## 2023-10-02 PROCEDURE — 85018 HEMOGLOBIN: CPT | Performed by: ADVANCED PRACTICE MIDWIFE

## 2023-10-02 PROCEDURE — 120N000001 HC R&B MED SURG/OB

## 2023-10-02 PROCEDURE — 250N000013 HC RX MED GY IP 250 OP 250 PS 637: Performed by: ADVANCED PRACTICE MIDWIFE

## 2023-10-02 PROCEDURE — 722N000001 HC LABOR CARE VAGINAL DELIVERY SINGLE

## 2023-10-02 PROCEDURE — 36415 COLL VENOUS BLD VENIPUNCTURE: CPT | Performed by: ADVANCED PRACTICE MIDWIFE

## 2023-10-02 RX ADMIN — Medication 1 TABLET: at 11:14

## 2023-10-02 RX ADMIN — IBUPROFEN 800 MG: 800 TABLET ORAL at 04:51

## 2023-10-02 RX ADMIN — DOCUSATE SODIUM 100 MG: 100 CAPSULE, LIQUID FILLED ORAL at 11:14

## 2023-10-02 RX ADMIN — ACETAMINOPHEN 650 MG: 325 TABLET, FILM COATED ORAL at 16:45

## 2023-10-02 RX ADMIN — IBUPROFEN 800 MG: 800 TABLET ORAL at 11:14

## 2023-10-02 RX ADMIN — ACETAMINOPHEN 650 MG: 325 TABLET, FILM COATED ORAL at 07:13

## 2023-10-02 RX ADMIN — ACETAMINOPHEN 650 MG: 325 TABLET, FILM COATED ORAL at 01:17

## 2023-10-02 ASSESSMENT — ACTIVITIES OF DAILY LIVING (ADL)
ADLS_ACUITY_SCORE: 20

## 2023-10-02 NOTE — PROGRESS NOTES
"Vaginal Delivery Postpartum Day 1    Patient Name:  Mariola Larose  :      1995  MRN:      [id number]      Assessment:    Normal postpartum course.  Anemia  S/P PPH    Plan:  Continue current care.      Subjective:  The patient feels well:  Voiding without difficulty, lochia normal, tolerating normal diet, ambulating without difficulty and passing flatus. Has had a BM without difficulty. Voiding independently without complication. Pain is well controlled with current medications.  The patient has no emotional concerns.  The baby is well and being fed by breast.  Denies signs and symptoms of anemia. We discussed the importance of Fe supplementation. All Bps have been WNL since birth but she did have some elevated pressures in labor.    YOB: 2023   Birth Time: 6:49 PM     Prenatal Complications include:   COVID-19 at 10 weeks  Thrombocytopenia (28 wks: 128; 30 wks: 136; 36 wks: 120)    Objective:  /71 (BP Location: Left arm, Patient Position: Semi-Randle's, Cuff Size: Adult Regular)   Pulse 84   Temp 98.8  F (37.1  C) (Oral)   Resp 16   Ht 1.626 m (5' 4\")   Wt 85.3 kg (188 lb)   SpO2 99%   Breastfeeding Unknown   BMI 32.27 kg/m    Patient Vitals for the past 24 hrs:   BP Temp Temp src Pulse Resp SpO2   10/02/23 0845 120/71 98.8  F (37.1  C) Oral 84 16 99 %   10/02/23 0434 125/69 98.2  F (36.8  C) Oral 95 17 --   10/02/23 0120 120/59 98.8  F (37.1  C) Oral 96 17 --   10/01/23 2138 118/70 -- -- -- -- --   10/01/23 2123 121/71 -- -- -- -- --   10/01/23 2108 126/70 -- -- 98 17 97 %   10/01/23 2053 135/75 -- -- -- -- --   10/01/23 2038 131/63 99.4  F (37.4  C) Oral -- -- --   10/01/23 2023 132/65 -- -- -- -- --   10/01/23 2008 138/70 -- -- -- -- --   10/01/23 1953 136/72 -- -- -- -- --   10/01/23 1938 (!) 142/67 -- -- 109 17 99 %   10/01/23 1929 -- -- -- -- -- 100 %   10/01/23 1924 -- -- -- -- -- 100 %   10/01/23 1923 (!) 141/78 -- -- -- -- --   10/01/23 1919 -- -- -- -- -- 99 % "   10/01/23 1914 -- -- -- -- -- 100 %   10/01/23 1909 -- -- -- -- -- 99 %   10/01/23 1908 134/78 -- -- -- -- --   10/01/23 1904 -- -- -- -- -- 97 %   10/01/23 1859 -- -- -- -- -- 100 %   10/01/23 1854 -- -- -- -- -- 96 %   10/01/23 1853 130/87 -- -- -- -- --   10/01/23 1849 -- -- -- -- -- 99 %   10/01/23 1844 -- -- -- -- -- 97 %   10/01/23 1839 -- -- -- -- -- 99 %   10/01/23 1834 -- -- -- -- -- 97 %   10/01/23 1829 -- -- -- -- -- 97 %   10/01/23 1824 -- -- -- -- -- 97 %   10/01/23 1819 -- -- -- -- -- 98 %   10/01/23 1814 -- -- -- -- -- 97 %   10/01/23 1809 -- -- -- -- -- 98 %   10/01/23 1804 -- -- -- -- -- 99 %   10/01/23 1759 -- -- -- -- -- 100 %   10/01/23 1754 -- -- -- -- -- 100 %   10/01/23 1749 -- -- -- -- -- 100 %   10/01/23 1744 -- -- -- -- -- 100 %   10/01/23 1739 -- -- -- -- -- 100 %   10/01/23 1734 -- -- -- -- -- 100 %   10/01/23 1729 -- -- -- -- -- 99 %   10/01/23 1724 -- -- -- -- -- 98 %   10/01/23 1719 -- -- -- -- -- 95 %   10/01/23 1718 (!) 142/87 -- -- -- -- --   10/01/23 1714 -- -- -- -- -- 96 %   10/01/23 1709 -- -- -- -- -- 98 %   10/01/23 1704 -- -- -- -- -- 97 %   10/01/23 1659 -- -- -- -- -- 98 %   10/01/23 1654 -- 99.2  F (37.3  C) Oral -- -- 100 %   10/01/23 1649 -- -- -- -- -- 99 %   10/01/23 1644 -- -- -- -- -- 98 %   10/01/23 1639 -- -- -- -- -- 96 %   10/01/23 1634 -- -- -- -- -- 97 %   10/01/23 1629 -- -- -- -- -- 100 %   10/01/23 1624 -- -- -- -- -- 100 %   10/01/23 1619 119/66 -- -- -- -- 100 %   10/01/23 1614 -- -- -- -- -- 100 %   10/01/23 1609 -- -- -- -- -- 100 %   10/01/23 1604 -- -- -- -- -- 100 %   10/01/23 1559 -- -- -- -- -- 99 %   10/01/23 1554 -- -- -- -- -- 100 %   10/01/23 1549 -- -- -- -- -- 100 %   10/01/23 1544 -- -- -- -- -- 98 %   10/01/23 1539 -- -- -- -- -- 98 %   10/01/23 1534 -- -- -- -- -- 100 %   10/01/23 1529 -- -- -- -- -- 99 %   10/01/23 1524 -- -- -- -- -- 98 %   10/01/23 1519 -- -- -- -- -- 97 %   10/01/23 1518 137/84 -- -- -- -- --   10/01/23 1514 -- 99.1  F  (37.3  C) Oral -- -- 97 %   10/01/23 1509 -- -- -- -- -- 98 %   10/01/23 1504 -- -- -- -- -- 98 %   10/01/23 1459 -- -- -- -- -- 97 %   10/01/23 1454 -- -- -- -- -- 97 %   10/01/23 1449 -- -- -- -- -- 97 %   10/01/23 1444 -- -- -- -- -- 96 %   10/01/23 1439 -- -- -- -- -- 96 %   10/01/23 1434 -- -- -- -- -- 97 %   10/01/23 1429 -- -- -- -- -- 97 %   10/01/23 1424 -- -- -- -- -- 97 %   10/01/23 1419 -- -- -- -- -- 97 %   10/01/23 1418 132/83 -- -- -- -- --   10/01/23 1415 -- 99.1  F (37.3  C) Oral -- -- --   10/01/23 1414 -- -- -- -- -- 96 %   10/01/23 1409 -- -- -- -- -- 98 %   10/01/23 1404 -- -- -- -- -- 95 %   10/01/23 1359 -- -- -- -- -- 94 %   10/01/23 1354 -- -- -- -- -- 94 %   10/01/23 1349 -- -- -- -- -- 94 %   10/01/23 1344 -- -- -- -- -- 94 %   10/01/23 1339 -- -- -- -- -- 94 %   10/01/23 1334 -- -- -- -- -- 94 %   10/01/23 1329 -- -- -- -- -- 95 %   10/01/23 1324 -- -- -- -- -- 95 %   10/01/23 1319 -- -- -- -- -- 95 %   10/01/23 1314 -- -- -- -- -- 96 %   10/01/23 1309 -- -- -- -- -- 96 %   10/01/23 1304 -- 99.2  F (37.3  C) Oral -- -- 95 %   10/01/23 1259 -- -- -- -- -- 96 %   10/01/23 1254 -- -- -- -- -- 98 %   10/01/23 1251 -- 99.2  F (37.3  C) Oral -- -- 100 %   10/01/23 1244 -- -- -- -- -- 100 %   10/01/23 1239 -- -- -- -- -- 100 %   10/01/23 1234 -- -- -- -- -- 100 %   10/01/23 1229 -- -- -- -- -- 100 %   10/01/23 1224 -- -- -- -- -- 100 %   10/01/23 1219 -- -- -- -- -- 99 %   10/01/23 1218 131/89 -- -- -- -- --   10/01/23 1214 -- -- -- -- -- 99 %   10/01/23 1209 -- -- -- -- -- 100 %   10/01/23 1204 -- -- -- -- -- 100 %   10/01/23 1159 -- -- -- -- -- 100 %   10/01/23 1154 -- -- -- -- -- 100 %   10/01/23 1149 -- -- -- -- -- 100 %   10/01/23 1144 -- -- -- -- -- 100 %   10/01/23 1139 -- -- -- -- -- 100 %   10/01/23 1134 -- -- -- -- -- 100 %   10/01/23 1129 -- -- -- -- -- 97 %   10/01/23 1124 -- -- -- -- -- 100 %   10/01/23 1119 -- -- -- -- -- 100 %   10/01/23 1118 123/76 -- -- -- -- --   10/01/23 1116  127/78 -- -- -- -- --   10/01/23 1114 125/72 -- -- -- -- 100 %   10/01/23 1112 128/71 -- -- -- -- --   10/01/23 1110 119/70 -- -- -- -- --   10/01/23 1109 -- -- -- -- -- 100 %   10/01/23 1108 120/71 -- -- -- -- --   10/01/23 1106 122/69 -- -- -- -- --   10/01/23 1104 126/76 99  F (37.2  C) Oral -- -- 100 %   10/01/23 1102 132/79 -- -- -- -- --   10/01/23 1100 130/71 -- -- -- -- --   10/01/23 1059 -- -- -- -- -- 100 %   10/01/23 1058 132/72 -- -- -- -- --   10/01/23 1056 125/63 -- -- -- -- --   10/01/23 1054 137/74 -- -- -- -- 100 %   10/01/23 1052 (!) 141/74 -- -- -- -- --   10/01/23 1050 (!) 142/70 -- -- -- -- --   10/01/23 1049 -- -- -- -- -- 99 %   10/01/23 1048 128/70 -- -- -- -- --   10/01/23 1046 132/70 -- -- -- -- --   10/01/23 1044 (!) 145/80 -- -- -- -- 97 %   10/01/23 1042 (!) 159/87 -- -- -- -- --   10/01/23 1040 (!) 151/84 -- -- -- -- --   10/01/23 1039 -- -- -- -- -- 99 %   10/01/23 1038 (!) 146/77 -- -- -- -- --   10/01/23 1036 (!) 158/85 -- -- -- -- --   10/01/23 1034 (!) 184/95 -- -- -- -- 100 %   10/01/23 1031 (!) 165/82 -- -- -- -- --   10/01/23 1030 (!) 176/88 -- -- -- -- --   10/01/23 1029 -- -- -- -- -- 99 %   10/01/23 1028 132/73 -- -- -- -- --   10/01/23 1026 132/72 -- -- -- -- --   10/01/23 1024 133/72 -- -- -- -- 100 %   10/01/23 1022 (!) 141/75 -- -- -- -- --   10/01/23 1020 (!) 141/76 99  F (37.2  C) Oral -- -- --   10/01/23 1019 -- -- -- -- -- 100 %   10/01/23 1018 (!) 142/77 -- -- -- -- --   10/01/23 1016 (!) 140/78 -- -- -- -- --   10/01/23 1014 (!) 146/82 -- -- -- -- 100 %   10/01/23 1012 (!) 142/76 -- -- -- -- --   10/01/23 1010 (!) 142/76 -- -- -- -- --   10/01/23 1009 -- -- -- -- -- 100 %   10/01/23 1008 (!) 154/77 -- -- -- -- --   10/01/23 1004 (!) 168/79 -- -- -- -- 100 %   10/01/23 1003 (!) 184/88 -- -- -- -- --   10/01/23 0959 -- -- -- -- -- 100 %   10/01/23 0954 -- -- -- -- -- 100 %   10/01/23 0949 -- -- -- -- -- 100 %   10/01/23 0944 -- -- -- -- -- 99 %   10/01/23 0939 -- --  -- -- -- 100 %   10/01/23 0934 -- -- -- -- -- 100 %   10/01/23 0929 -- -- -- -- -- 99 %   10/01/23 0924 -- -- -- -- -- 98 %   10/01/23 0919 -- -- -- -- -- 100 %   10/01/23 0914 -- -- -- -- -- 100 %       Exam: Patient A&O x 3. No acute distress, breathing unlabored.The amount and color of the lochia is appropriate for the duration of recovery. Uterine fundus is firm at U. Perineum: no significant edema      Lab Results   Component Value Date    AS Negative 10/01/2023    HGB 9.8 (L) 10/02/2023       Immunization History   Administered Date(s) Administered    COVID-19 Monovalent 18+ (Moderna) 12/22/2021       Provider:  YOVANNY Harrington CNM    Date:  10/2/2023  Time:  9:05 AM       Other Specify

## 2023-10-02 NOTE — CONSULTS
Integrative Therapy Consult    Healing PresenceYes  Essential Oils: Topical (EO/Topical Oil)     Sandy -  HC, Lavender Massage Oil - HC       Healing Music:       Breathwork:       Guided Imagery:       Acupressure:       Oshibori:       Energy Therapy:       Healing Touch:       Reiki:       Qi Gong:     Massage: Foot, Targeted massage      Targeted Massage: Legs  Sleep Promotion:       Other Therapy:       Intervention Reason: Edema     Pre and Post Session Scores: Patient Desires Treatment: yes                             Delivery:         Referrals:      Kalyani L Born    MLD - lower extremities

## 2023-10-02 NOTE — LACTATION NOTE
This note was copied from a baby's chart.  Referred to Mariola to assist with a feeding. Angelika Gadruno is the first baby for Hodan. She has a Spectra pump at home.    Breast massage and compression were demonstrated. Colostrum was present on the nipple.    With Angelika on a feeding cushion from home, a football hold was initiated on the R breast. Tipping the nipple toward the roof of baby's mouth, a latch was obtained but creating a wider gape of the mouth needed to be done for comfort. Also, Angelika's lips need to be flanged outward. With a breast compression,swallows were noted. The feeding was continued on the L breast in a laid back hold. Once again,the latch needed to adjusted for a deeper latch. Swallows were pointed out to parents.    Resources for after d/c were discussed including ECFE and outpt lactation.    To continue to follow while inpt.

## 2023-10-02 NOTE — L&D DELIVERY NOTE
Vaginal Delivery Note    Name: Mariola Larose  : 1995  MRN: [id number]    PRE DELIVERY DIAGNOSIS  1) 28 year old  1 Para 0 at 40w6d      2) PROM    POST DELIVERY DIAGNOSIS  1) 28 year old  @ 40w6d  2) Delivery of a viable female infant weight pending via   3) PPH w/ placement of CHARLIE Device  4) GHTN    YOB: 2023     Birth Time: 6:49 PM       Augmentation Yes              Indication: ineffetive contraction pattern  Induction Yes                      Indication: PROM    Monitors: External, FSE, and IUPC     GBS: Negative    ROM: PROM, AROM forbag, SROM for mec after that  Fluid Type: Meconium    Labor Analgesia/Anesthesia:Epidural    Cord pH obtained: No  Placenta Date/Time: 10/1/2023  7:03 PM   Placenta submitted to Pathology: No    Description of procedure:   28 year old  with PNC w/ MNWC and pregnancy complicated by:  COVID-19 at 10 weeks  Thrombocytopenia (28 wks: 128; 30 wks: 136; 36 wks: 120)    Mariola presented to L&D with PROM ( 1530) and no labor.  Her hospital course was complicated by prolonged rupture of membranes, FHT abnormalities, and PPH.  She was started on pitocin at 1901 yesterday. Became more uncomfortable around 0200 and received labor epidural. AROM of forebage around 0230. Mec fluid noted around 0330. 4.5cm at 0923. Prolonged deceration around 1030 and was found to be 6cm (FSE placed). 7cm and puffy cervix at 1216 (IUPC placed at this time), continued with position changes and there was a very dysfunctional contraction pattern in and out of adequate MVU's. 8cm at 1447. Complete by 1703. 2nd stage 2 hours in a variety of positions. TXA given ~1 hour into 2nd stage. Brought baby to large crown with tug o' war positioning. Deep deceleration to 70's with large crown and we discussed episiotomy if not delivered on next push. With next push, vertex delivered direct OP, restitution to LOP. Head/shoulders/body delivered with ease. There was a loose  nuchal cord that the infant was somersaulted through. Delivery team was present for delivery due to mec fluid and FHT's but was not needed.       Shoulder Dystocia No  Operative Vaginal Delivery No  Infant spontaneously delivered over an intact perineum.   Infant delivered in LOP position.  Nuchal cord Yes    Delivered through    Placenta spontaneously delivered: 10/1/2023  7:03 PM  grossly intact with 3 vessel cord.    Infant: Live, Initially stunned infant  was handed to mom.      Delivery was complicated by postpartum hemorrhage due to uterine atony. Interventions included fundal massage, pitocin, cytotec, and TXA. Continued lower uterine segment atony. Fern was placed per  guidelines. Per protocol will place fernandez, remove Fern in 1-2 hours after bleeding controlled, and give 1 dose of ancef. Hemoglobin pending. Maternal vitals have remained stable and she feels well.    Delivery QBL (mL): 700    Mother and Infant stable.    Dr. Renee aware of patient status and remains available for consultation and collaboration as needed.      YOVANNY Macias CNM    10/1/2023 7:26 PM

## 2023-10-02 NOTE — CONSULTS
"ACUPUNCTURIST TREATMENT NOTE    Name: Mariola Larose  :  1995  MRN:  [id number]    Acupuncture Treatment  Patient Type: Maternity  Intervention Reason: Lactation  Patient complaint:: Insufficient lactation  Initial insertions: Live 17, Si 1, Li 4, Sharda 3  Number of needles inserted: 7  Number of needles removed: 7         \"Risks and benefits of acupuncture were discussed with patient. Consent for treatment was given. We thank you for the referral.\"     Catracho oPlanco    Date:  10/2/2023  Time:  1:37 PM    "

## 2023-10-02 NOTE — PLAN OF CARE
Mariola Shirley removed; fundus firm and midline, bleeding WNL. She is voiding independently. Pain is being managed with Tylenol, ibuprofen and ice. Supportive spouse bedside.   Carmita Junior RN      Problem: Plan of Care - These are the overarching goals to be used throughout the patient stay.    Goal: Optimal Comfort and Wellbeing  Outcome: Progressing   Goal Outcome Evaluation:         Problem: Plan of Care - These are the overarching goals to be used throughout the patient stay.    Goal: Readiness for Transition of Care  Outcome: Progressing     Problem: Labor  Goal: Stable Fetal Wellbeing  Outcome: Progressing

## 2023-10-02 NOTE — ANESTHESIA POSTPROCEDURE EVALUATION
Patient: Mariola Larose    Procedure: * No procedures listed *       Anesthesia Type:  No value filed.    Note:  Disposition: Inpatient   Postop Pain Control: Uneventful            Sign Out: Well controlled pain   PONV: No   Neuro/Psych: Uneventful            Sign Out: Acceptable/Baseline neuro status   Airway/Respiratory: Uneventful            Sign Out: Acceptable/Baseline resp. status   CV/Hemodynamics: Uneventful            Sign Out: Acceptable CV status; No obvious hypovolemia; No obvious fluid overload   Other NRE: NONE   DID A NON-ROUTINE EVENT OCCUR? No       Last vitals:  Vitals:    10/01/23 2138 10/02/23 0120 10/02/23 0434   BP: 118/70 120/59 125/69   Pulse:  96 95   Resp:  17 17   Temp:  37.1  C (98.8  F) 36.8  C (98.2  F)   SpO2:          Electronically Signed By: Zbigniew Wagner MD  October 2, 2023  5:26 AM

## 2023-10-03 VITALS
DIASTOLIC BLOOD PRESSURE: 89 MMHG | HEIGHT: 64 IN | HEART RATE: 79 BPM | WEIGHT: 175.8 LBS | SYSTOLIC BLOOD PRESSURE: 137 MMHG | TEMPERATURE: 98 F | RESPIRATION RATE: 16 BRPM | BODY MASS INDEX: 30.01 KG/M2 | OXYGEN SATURATION: 97 %

## 2023-10-03 PROCEDURE — 250N000013 HC RX MED GY IP 250 OP 250 PS 637: Performed by: ADVANCED PRACTICE MIDWIFE

## 2023-10-03 RX ORDER — DOCUSATE SODIUM 100 MG/1
200 CAPSULE, LIQUID FILLED ORAL 2 TIMES DAILY PRN
Qty: 30 CAPSULE | Refills: 0 | Status: SHIPPED | OUTPATIENT
Start: 2023-10-03

## 2023-10-03 RX ORDER — FERROUS SULFATE 325(65) MG
325 TABLET, DELAYED RELEASE (ENTERIC COATED) ORAL 2 TIMES DAILY WITH MEALS
Qty: 60 TABLET | Refills: 1 | Status: SHIPPED | OUTPATIENT
Start: 2023-10-03

## 2023-10-03 RX ORDER — IBUPROFEN 800 MG/1
800 TABLET, FILM COATED ORAL EVERY 8 HOURS PRN
Qty: 30 TABLET | Refills: 0 | Status: SHIPPED | OUTPATIENT
Start: 2023-10-03

## 2023-10-03 RX ADMIN — ACETAMINOPHEN 650 MG: 325 TABLET, FILM COATED ORAL at 08:52

## 2023-10-03 RX ADMIN — Medication 1 TABLET: at 08:52

## 2023-10-03 RX ADMIN — IBUPROFEN 800 MG: 800 TABLET ORAL at 08:52

## 2023-10-03 ASSESSMENT — ACTIVITIES OF DAILY LIVING (ADL)
ADLS_ACUITY_SCORE: 20

## 2023-10-03 NOTE — PLAN OF CARE
Patient discharged home around 1130 with significant other and infant. Discharge education completed and AVS printed and reviewed. All personal belongings were returned to patient. Patient verbalized and demonstrated understanding and had no further questions or concerns. Patient declined wheelchair transportation and ambulated to [facility] accompanied by hospital staff.

## 2023-10-03 NOTE — DISCHARGE INSTRUCTIONS
Warning Signs after Having a Baby    Keep this paper on your fridge or somewhere else where you can see it.    Call your provider if you have any of these symptoms up to 12 weeks after having your baby.    Thoughts of hurting yourself or your baby  Pain in your chest or trouble breathing  Severe headache not helped by pain medicine  Eyesight concerns (blurry vision, seeing spots or flashes of light, other changes to eyesight)  Fainting, shaking or other signs of a seizure    Call 9-1-1 if you feel that it is an emergency.     The symptoms below can happen to anyone after giving birth. They can be very serious. Call your provider if you have any of these warning signs.    My provider s phone number: _______________________    Losing too much blood (hemorrhage)    Call your provider if you soak through a pad in less than an hour or pass blood clots bigger than a golf ball. These may be signs that you are bleeding too much.    Blood clots in the legs or lungs    After you give birth, your body naturally clots its blood to help prevent blood loss. Sometimes this increased clotting can happen in other areas of the body, like the legs or lungs. This can block your blood flow and be very dangerous.     Call your provider if you:  Have a red, swollen spot on the back of your leg that is warm or painful when you touch it.   Are coughing up blood.     Infection    Call your provider if you have any of these symptoms:  Fever of 100.4 F (38 C) or higher.  Pain or redness around your stitches if you had an incision.   Any yellow, white, or green fluid coming from places where you had stitches or surgery.    Mood Problems (postpartum depression)    Many people feel sad or have mood changes after having a baby. But for some people, these mood swings are worse.     Call your provider right away if you feel so anxious or nervous that you can't care for yourself or your baby.    Preeclampsia (high blood pressure)    Even if you  didn't have high blood pressure when you were pregnant, you are at risk for the high blood pressure disease called preeclampsia. This risk can last up to 12 weeks after giving birth.     Call your provider if you have:   Pain on your right side under your rib cage  Sudden swelling in the hands and face    Remember: You know your body. If something doesn't feel right, get medical help.     For informational purposes only. Not to replace the advice of your health care provider. Copyright 2020 [facility]. All rights reserved. Clinically reviewed by Isatu Nicole, RNC-OB, MSN. eYeka [id number] - Rev 02/23.

## 2023-10-03 NOTE — PLAN OF CARE
Goal Outcome Evaluation:    Vitals stable. Minimal pain, no prn medications needed. Bonding well with baby, breast feeding q3 hrs. Up independently, voiding, minimal bleeding.       Problem: Plan of Care - These are the overarching goals to be used throughout the patient stay.    Goal: Optimal Comfort and Wellbeing  Outcome: Progressing  Intervention: Monitor Pain and Promote Comfort  Recent Flowsheet Documentation  Taken 10/3/2023 0045 by Emily Riojas RN  Pain Management Interventions: declines     Problem: Breastfeeding  Goal: Effective Breastfeeding  Outcome: Progressing

## 2023-10-03 NOTE — DISCHARGE SUMMARY
OB Discharge Summary      Date:  10/3/2023    Name:  Mariola Larose  :  1995  MRN:  [id number]      Admission Date:  2023  Delivery Date: 10/1/2023   Gestational Age at Delivery:  40w6d  Discharge Date:  10/3/2023    Principal Diagnosis:    Patient Active Problem List   Diagnosis    Encounter for triage in pregnant patient    PROM (premature rupture of membranes)    Anemia due to blood loss, acute     (normal spontaneous vaginal delivery)    Postpartum hemorrhage       Conditions complicating Pregnancy:   COVID-19 at 10 weeks  Thrombocytopenia    Procedure(s) Performed:  Fern placement for PPH (removed)    Indication for :  NA  Indication for Induction:  NA     Condition at Discharge:  Stable    Discharge Medications:      Review of your medicines        START taking        Dose / Directions   docusate sodium 100 MG capsule  Commonly known as: COLACE      Dose: 200 mg  Take 2 capsules (200 mg) by mouth 2 times daily as needed for constipation  Quantity: 30 capsule  Refills: 0     ferrous sulfate 325 (65 Fe) MG EC tablet  Commonly known as: FE TABS      Dose: 325 mg  Take 1 tablet (325 mg) by mouth 2 times daily (with meals)  Quantity: 60 tablet  Refills: 1     ibuprofen 800 MG tablet  Commonly known as: ADVIL/MOTRIN      Dose: 800 mg  Take 1 tablet (800 mg) by mouth every 8 hours as needed for other (cramping)  Quantity: 30 tablet  Refills: 0               Where to get your medicines        These medications were sent to [id number] [address]  [address] [facility] [address]      Phone: [phone]   docusate sodium 100 MG capsule  ferrous sulfate 325 (65 Fe) MG EC tablet  ibuprofen 800 MG tablet          Discharge Plan:    Follow up with /CNM:  6 wks   Patient Instructions:      Physical activity: as tolerated    Diet:  as tolerated    Medication: see list    Other:        Physician/CNM: Sailaja Snell  CNM    Name:  Mariola Larose  :  1995  MRN:  [id number]

## 2023-12-03 ENCOUNTER — HEALTH MAINTENANCE LETTER (OUTPATIENT)
Age: 28
End: 2023-12-03

## 2025-01-05 ENCOUNTER — HEALTH MAINTENANCE LETTER (OUTPATIENT)
Age: 30
End: 2025-01-05

## 2025-05-02 ENCOUNTER — TRANSFERRED RECORDS (OUTPATIENT)
Dept: HEALTH INFORMATION MANAGEMENT | Facility: CLINIC | Age: 30
End: 2025-05-02
Payer: COMMERCIAL

## 2025-05-07 ENCOUNTER — MEDICAL CORRESPONDENCE (OUTPATIENT)
Dept: HEALTH INFORMATION MANAGEMENT | Facility: CLINIC | Age: 30
End: 2025-05-07
Payer: COMMERCIAL